# Patient Record
Sex: FEMALE | Race: WHITE | NOT HISPANIC OR LATINO | URBAN - METROPOLITAN AREA
[De-identification: names, ages, dates, MRNs, and addresses within clinical notes are randomized per-mention and may not be internally consistent; named-entity substitution may affect disease eponyms.]

---

## 2021-01-10 ENCOUNTER — NURSE TRIAGE (OUTPATIENT)
Dept: OTHER | Facility: OTHER | Age: 21
End: 2021-01-10

## 2021-01-10 DIAGNOSIS — Z20.828 SARS-ASSOCIATED CORONAVIRUS EXPOSURE: Primary | ICD-10-CM

## 2021-01-10 NOTE — TELEPHONE ENCOUNTER
Regarding: covid test request-asymptomaic-exposure  ----- Message from [facility] sent at 1/10/2021  4:22 PM EST -----  "I have been in direct  contact with a covid positive patient "

## 2021-01-11 NOTE — TELEPHONE ENCOUNTER
Reason for Disposition   [1] CLOSE CONTACT COVID-19 EXPOSURE within last 14 days AND [2] NO symptoms    Answer Assessment - Initial Assessment Questions  1  Were you within 6 feet or less, for up to 15 minutes or more with a person that has a confirmed COVID-19 test?     Yes      2  What was the date of your exposure? Jan 6    3  Are you experiencing any symptoms attributed to the virus?  (Assess for SOB, cough, fever, difficulty breathing)     No symptoms at this time  4  HIGH RISK: Do you have any history heart or lung conditions, weakened immune system, diabetes, Asthma, CHF, HIV, COPD, Chemo, renal failure, sickle cell, etc?     No chronic medical history  5  PREGNANCY: Are you pregnant or did you recently give birth? Denies  Protocols used: CORONAVIRUS (FFSSU-80) EXPOSURE-ADULT-AH    Pt has out of network PCP